# Patient Record
Sex: FEMALE | Employment: FULL TIME | URBAN - METROPOLITAN AREA
[De-identification: names, ages, dates, MRNs, and addresses within clinical notes are randomized per-mention and may not be internally consistent; named-entity substitution may affect disease eponyms.]

---

## 2023-12-11 ENCOUNTER — OFFICE VISIT (OUTPATIENT)
Dept: FAMILY MEDICINE CLINIC | Facility: CLINIC | Age: 63
End: 2023-12-11

## 2023-12-11 VITALS — DIASTOLIC BLOOD PRESSURE: 76 MMHG | SYSTOLIC BLOOD PRESSURE: 122 MMHG

## 2023-12-11 DIAGNOSIS — Z01.30 BLOOD PRESSURE CHECK: Primary | ICD-10-CM

## 2024-02-28 ENCOUNTER — APPOINTMENT (OUTPATIENT)
Dept: GENERAL RADIOLOGY | Age: 64
End: 2024-02-28
Attending: PHYSICIAN ASSISTANT
Payer: COMMERCIAL

## 2024-02-28 ENCOUNTER — HOSPITAL ENCOUNTER (OUTPATIENT)
Age: 64
Discharge: HOME | End: 2024-02-28
Payer: COMMERCIAL

## 2024-02-28 VITALS
HEART RATE: 64 BPM | BODY MASS INDEX: 30.12 KG/M2 | SYSTOLIC BLOOD PRESSURE: 106 MMHG | WEIGHT: 170 LBS | TEMPERATURE: 97 F | HEIGHT: 63 IN | RESPIRATION RATE: 18 BRPM | OXYGEN SATURATION: 93 % | DIASTOLIC BLOOD PRESSURE: 89 MMHG

## 2024-02-28 DIAGNOSIS — M17.11 OSTEOARTHRITIS OF RIGHT KNEE, UNSPECIFIED OSTEOARTHRITIS TYPE: ICD-10-CM

## 2024-02-28 DIAGNOSIS — M25.461 EFFUSION OF RIGHT KNEE: ICD-10-CM

## 2024-02-28 DIAGNOSIS — M25.561 ACUTE PAIN OF RIGHT KNEE: Primary | ICD-10-CM

## 2024-02-28 PROCEDURE — 73562 X-RAY EXAM OF KNEE 3: CPT | Performed by: PHYSICIAN ASSISTANT

## 2024-02-28 RX ORDER — ATENOLOL AND CHLORTHALIDONE TABLET 100; 25 MG/1; MG/1
1 TABLET ORAL DAILY
COMMUNITY

## 2024-02-28 RX ORDER — ATORVASTATIN CALCIUM 10 MG/1
20 TABLET, FILM COATED ORAL NIGHTLY
COMMUNITY

## 2024-02-28 NOTE — ED PROVIDER NOTES
Patient Seen in: [facility]      History     Chief Complaint   Patient presents with    Knee Pain     Stated Complaint: right knee    Subjective:   HPI    64 YO female, PMHx significant for psoriasis, diabetes, HTN, presents to immediate care for evaluation of atraumatic right knee pain starting 3 days ago.  Patient states right anterior knee appears swollen, improved since onset. Antalgic gait.         Objective:   Past Medical History:   Diagnosis Date    Diabetes (HCC)     Essential hypertension     Hyperlipidemia     Psoriasis               Past Surgical History:   Procedure Laterality Date    TOTAL ABDOM HYSTERECTOMY                  Social History     Socioeconomic History    Marital status:    Tobacco Use    Smoking status: Never    Smokeless tobacco: Never   Vaping Use    Vaping Use: Never used   Substance and Sexual Activity    Alcohol use: Not Currently    Drug use: Not Currently              Review of Systems    Positive for stated complaint: right knee  Other systems are as noted in HPI.  Constitutional and vital signs reviewed.      All other systems reviewed and negative except as noted above.    Physical Exam     ED Triage Vitals [02/28/24 1046]   /89   Pulse 64   Resp 18   Temp 97.3 °F (36.3 °C)   Temp src Temporal   SpO2 93 %   O2 Device None (Room air)       Current:/89   Pulse 64   Temp 97.3 °F (36.3 °C) (Temporal)   Resp 18   Ht 160 cm (5' 3\")   Wt 77.1 kg   SpO2 93%   BMI 30.11 kg/m²         Physical Exam  Vitals and nursing note reviewed.   Constitutional:       General: She is not in acute distress.     Appearance: Normal appearance. She is not ill-appearing, toxic-appearing or diaphoretic.   Cardiovascular:      Rate and Rhythm: Normal rate.   Pulmonary:      Effort: Pulmonary effort is normal. No respiratory distress.   Musculoskeletal:      Right knee: Effusion present. Normal range of motion. Tenderness (mild diffuse tenderness at anterior  knee) present.   Neurological:      Mental Status: She is alert and oriented to person, place, and time.   Psychiatric:         Mood and Affect: Mood normal.         Behavior: Behavior normal.         ED Course   Labs Reviewed - No data to display    XR KNEE (3 VIEWS), RIGHT (CPT=73562)    Result Date: 2/28/2024  PROCEDURE:  XR KNEE ROUTINE (3 VIEWS), RIGHT (CPT=73562)  TECHNIQUE:  Three views were obtained including patellar view.  COMPARISON:  None.  INDICATIONS:  PATIENT STATED HISTORY: (As transcribed by Technologist)  Pt c/o sudden onset of right knee pain. Pt states she's having difficulty walking.  no known injury.     FINDINGS:  BONES:  There is moderate joint space narrowing in all 3 compartments of the knee joint with marginal osteophyte formation. SOFT TISSUES:  Negative.  No visible soft tissue swelling. EFFUSION:  There is a mild to moderate knee joint effusion. OTHER:  Negative.            CONCLUSION:  There is tricompartmental osteoarthritis in the knee.  There is no acute fracture.   LOCATION:  [facility]   Dictated by (CST): Elmo Brizuela MD on 2/28/2024 at 11:37 AM     Finalized by (CST): Elmo Brizuela MD on 2/28/2024 at 11:38 AM         I independently reviewed XR images. No fracture.    MDM      This is a 64 YO female, PMHx significant for psoriasis, diabetes, HTN, with atraumatic right knee pain starting 3 days ago.     Differential diagnosis considered but not limited to joint effusion, osteoarthritis or psoriatic arthritis, less likely fracture    Right knee x-ray remarkable for joint effusion and osteoarthritis, no fracture.  Conservative initial management discussed with patient understanding.  PCP follow-up as needed.      Medical Decision Making  Amount and/or Complexity of Data Reviewed  Radiology: ordered and independent interpretation performed. Decision-making details documented in ED Course.    Risk  OTC drugs.        Disposition and Plan     Clinical Impression:  1. Acute pain of right  knee    2. Effusion of right knee    3. Osteoarthritis of right knee, unspecified osteoarthritis type         Disposition:  Discharge  2/28/2024 11:47 am    Follow-up:  No follow-up provider specified.        Medications Prescribed:  Discharge Medication List as of 2/28/2024 11:48 AM